# Patient Record
Sex: FEMALE | Race: WHITE | NOT HISPANIC OR LATINO | Employment: FULL TIME | URBAN - METROPOLITAN AREA
[De-identification: names, ages, dates, MRNs, and addresses within clinical notes are randomized per-mention and may not be internally consistent; named-entity substitution may affect disease eponyms.]

---

## 2023-04-17 ENCOUNTER — OFFICE VISIT (OUTPATIENT)
Dept: URGENT CARE | Facility: CLINIC | Age: 33
End: 2023-04-17
Payer: COMMERCIAL

## 2023-04-17 VITALS
DIASTOLIC BLOOD PRESSURE: 84 MMHG | BODY MASS INDEX: 45.18 KG/M2 | SYSTOLIC BLOOD PRESSURE: 116 MMHG | HEIGHT: 63 IN | HEART RATE: 102 BPM | RESPIRATION RATE: 16 BRPM | OXYGEN SATURATION: 100 % | TEMPERATURE: 98.8 F | WEIGHT: 255 LBS

## 2023-04-17 DIAGNOSIS — T78.40XA ALLERGIC REACTION, INITIAL ENCOUNTER: ICD-10-CM

## 2023-04-17 DIAGNOSIS — R21 SKIN RASH: ICD-10-CM

## 2023-04-17 PROCEDURE — 99203 OFFICE O/P NEW LOW 30 MIN: CPT | Performed by: PHYSICIAN ASSISTANT

## 2023-04-17 RX ORDER — PREDNISONE 10 MG/1
TABLET ORAL
Qty: 21 TABLET | Refills: 0 | Status: SHIPPED | OUTPATIENT
Start: 2023-04-17 | End: 2023-04-26

## 2023-04-17 RX ORDER — LORATADINE 10 MG/1
10 TABLET ORAL DAILY
COMMUNITY

## 2023-04-17 RX ORDER — DEXAMETHASONE SODIUM PHOSPHATE 10 MG/ML
10 INJECTION INTRAMUSCULAR; INTRAVENOUS ONCE
Status: COMPLETED | OUTPATIENT
Start: 2023-04-17 | End: 2023-04-17

## 2023-04-17 RX ADMIN — DEXAMETHASONE SODIUM PHOSPHATE 10 MG: 10 INJECTION INTRAMUSCULAR; INTRAVENOUS at 15:25

## 2023-04-17 ASSESSMENT — ENCOUNTER SYMPTOMS
SORE THROAT: 0
EYE DISCHARGE: 0
EYE REDNESS: 0
VOMITING: 0
SHORTNESS OF BREATH: 0
DIARRHEA: 0
FEVER: 0

## 2023-04-17 NOTE — PROGRESS NOTES
Subjective     Tea Sethi is a 32 y.o. female who presents with Allergic Reaction (X yesterday on and pt. Developed a rash on face, arms, thighs, bilateral ears and R shoulder. Pt. Is travelling and thinks it may be something at her hotel. )          This is a new problem.   The patient presents to clinic complaining of a possible allergic reaction x1 day.  The patient states she is currently staying at a hotel.  The patient states yesterday morning she woke up with a rash to her right shoulder and face.  The patient describes the rash as itchy.  The patient states that she contacted housekeeping and had them change all of the sheets and the pillow in her hotel room.  The patient states this morning she woke up with a worsening rash to her face, bilateral upper extremities, and bilateral lower extremities.  The patient states the rash is extremely itchy.  The patient notes some mild swelling to her face.  She reports no swelling of her lips, tongue, or throat.  The patient denies difficulty breathing and/or difficulty swallowing.  She reports no associated fever.  The patient is taken OTC Benadryl for her current symptoms without improvement.    Rash  This is a new problem. The current episode started yesterday. The problem is unchanged. The rash is diffuse. The rash is characterized by itchiness. She was exposed to nothing. Associated symptoms include facial edema. Pertinent negatives include no diarrhea, fever, shortness of breath, sore throat or vomiting. Past treatments include antihistamine.     PMH:  has no past medical history on file.  MEDS:   Current Outpatient Medications:     loratadine (CLARITIN) 10 MG Tab, Take 10 mg by mouth every day., Disp: , Rfl:   ALLERGIES: No Known Allergies  SURGHX: No past surgical history on file.  SOCHX:  reports that she has never smoked. She has never used smokeless tobacco. She reports current alcohol use. She reports that she does not use drugs.  FH: Family history  "was reviewed, no pertinent findings to report      Review of Systems   Constitutional:  Negative for fever.   HENT:  Negative for sore throat.    Eyes:  Negative for discharge and redness.   Respiratory:  Negative for shortness of breath.    Gastrointestinal:  Negative for diarrhea and vomiting.   Skin:  Positive for rash.            Objective     /84   Pulse (!) 102   Temp 37.1 °C (98.8 °F) (Temporal)   Resp 16   Ht 1.6 m (5' 3\")   Wt 116 kg (255 lb)   LMP 03/18/2023   SpO2 100%   BMI 45.17 kg/m²      Physical Exam  Constitutional:       General: She is not in acute distress.     Appearance: Normal appearance. She is not ill-appearing.   HENT:      Head: Normocephalic and atraumatic.      Comments:   Mild edema to the face, including the infraorbital regions and bilateral cheeks.     Right Ear: External ear normal.      Left Ear: External ear normal.      Nose: Nose normal.      Mouth/Throat:      Mouth: Mucous membranes are moist. No angioedema.      Pharynx: Oropharynx is clear. No posterior oropharyngeal erythema.   Eyes:      Extraocular Movements: Extraocular movements intact.      Conjunctiva/sclera: Conjunctivae normal.   Neck:      Trachea: Phonation normal.   Cardiovascular:      Rate and Rhythm: Normal rate and regular rhythm.      Heart sounds: Normal heart sounds.   Pulmonary:      Effort: Pulmonary effort is normal. No respiratory distress.      Breath sounds: Normal breath sounds. No wheezing.   Musculoskeletal:         General: Normal range of motion.      Cervical back: Normal range of motion and neck supple.   Skin:     General: Skin is warm and dry.      Findings: Rash present.      Comments:   Diffuse erythematous maculopapular rash is present to the face and bilateral upper and lower extremities with intermittent wheals.  No tenderness to palpation.  No increased warmth.  No discharge/weeping.  No crusting.  No vesicles.  No pustules.   Neurological:      Mental Status: She is " alert and oriented to person, place, and time.             Progress:  Decadron 10mg PO given in clinic               Assessment & Plan          1. Allergic reaction, initial encounter  - dexamethasone (DECADRON) injection (check route below) 10 mg    2. Skin rash  - predniSONE (DELTASONE) 10 MG Tab; Take 4 Tablets by mouth every day for 3 days, THEN 2 Tablets every day for 3 days, THEN 1 Tablet every day for 3 days.  Dispense: 21 Tablet; Refill: 0    The patient's presenting symptoms and physical exam findings are consistent with an acute skin rash likely secondary to an allergic reaction.  The patient was given Decadron 10 mg p.o. in clinic for her current symptoms.  We will prescribe the patient a tapering course of oral prednisone for her acute skin rash.  The patient is currently not experiencing any signs/symptoms of an acute anaphylactic reaction.  Advised the patient to monitor for worsening signs or symptoms.  Recommend OTC medications and supportive care for symptomatic management.  Recommend the patient follow-up with her PCP as needed.  Discussed return precautions with the patient, and she verbalized understanding.    Differential diagnoses, supportive care, and indications for immediate follow-up discussed with patient.   Instructed to return to clinic or nearest emergency department for any change in condition, further concerns, or worsening of symptoms.    OTC antihistamines for symptomatically  OTC Pepcid for symptomatic relief  OTC anti-itch cream for symptomatic relief  Monitor for worsening signs or symptoms  Follow-up with PCP as needed  Return to clinic or go to the ED if symptoms worsen or fail to improve, or if patient should develop worsening/increasing/persistent skin rash, itching, pain/tenderness, increased redness warmth, discharge/drainage, swelling face, lips, tongue, or throat, difficulty breathing, difficulty swallowing, fever/chills, and/or any concerning symptoms.     Discussed plan  with the patient, and she agrees to the above.     I personally reviewed prior external notes and test results pertinent to today's visit.  I have independently reviewed and interpreted all diagnostics ordered during this urgent care visit.     Please note that this dictation was created using voice recognition software. I have made every reasonable attempt to correct obvious errors, but I expect that there may be errors of grammar and possibly content that I did not discover before finalizing the note.     This note was electronically signed by Marquita Fournier PA-C